# Patient Record
Sex: MALE | Race: WHITE | URBAN - METROPOLITAN AREA
[De-identification: names, ages, dates, MRNs, and addresses within clinical notes are randomized per-mention and may not be internally consistent; named-entity substitution may affect disease eponyms.]

---

## 2017-02-10 ENCOUNTER — OFFICE VISIT (OUTPATIENT)
Dept: FAMILY MEDICINE CLINIC | Facility: CLINIC | Age: 32
End: 2017-02-10

## 2017-02-10 VITALS
DIASTOLIC BLOOD PRESSURE: 64 MMHG | BODY MASS INDEX: 27.4 KG/M2 | WEIGHT: 185 LBS | TEMPERATURE: 98 F | SYSTOLIC BLOOD PRESSURE: 110 MMHG | HEART RATE: 84 BPM | HEIGHT: 69 IN

## 2017-02-10 DIAGNOSIS — R53.83 FATIGUE, UNSPECIFIED TYPE: ICD-10-CM

## 2017-02-10 DIAGNOSIS — R35.0 URINARY FREQUENCY: ICD-10-CM

## 2017-02-10 DIAGNOSIS — Z00.00 HEALTH CARE MAINTENANCE: Primary | ICD-10-CM

## 2017-02-10 DIAGNOSIS — R06.83 SNORING: ICD-10-CM

## 2017-02-10 PROCEDURE — 99385 PREV VISIT NEW AGE 18-39: CPT | Performed by: FAMILY MEDICINE

## 2017-02-10 PROCEDURE — 99213 OFFICE O/P EST LOW 20 MIN: CPT | Performed by: FAMILY MEDICINE

## 2017-02-10 NOTE — PROGRESS NOTES
[address]  PROGRESS NOTE  Chief Complaint:   Patient presents with: Well Adult: get established      HPI:   This is a 36year old male coming in for to get established.   Overall patient states he has done very well he is a single fathe or at rest.  RESPIRATORY:  Denies shortness of breath, wheezing, cough or sputum. GASTROINTESTINAL:  Denies abdominal pain, nausea, vomiting, constipation, diarrhea, or blood in stool.   MUSCULOSKELETAL:  Denies weakness, muscle aches, back pain, joint heron clubbing, FROM, 2+ dorsalis pedis pulses bilaterally. NEURO:  No deficit, normal gait, strength and tone, sensory intact, normal reflexes. ASSESSMENT AND PLAN:   Thania Barrow was seen today for well adult.     Diagnoses and all orders for this visit:    Health

## 2017-02-10 NOTE — PATIENT INSTRUCTIONS
Schedule fasting labs, need 12 hours of water only  Regular exercise  Weight loss  Needs sleep eval of snoring

## 2017-02-17 ENCOUNTER — OFFICE VISIT (OUTPATIENT)
Dept: FAMILY MEDICINE CLINIC | Facility: CLINIC | Age: 32
End: 2017-02-17

## 2017-02-17 ENCOUNTER — LAB ENCOUNTER (OUTPATIENT)
Dept: LAB | Age: 32
End: 2017-02-17
Attending: FAMILY MEDICINE
Payer: COMMERCIAL

## 2017-02-17 VITALS
TEMPERATURE: 99 F | HEART RATE: 87 BPM | BODY MASS INDEX: 25 KG/M2 | DIASTOLIC BLOOD PRESSURE: 74 MMHG | WEIGHT: 166.19 LBS | SYSTOLIC BLOOD PRESSURE: 124 MMHG

## 2017-02-17 DIAGNOSIS — R06.83 SNORING: ICD-10-CM

## 2017-02-17 DIAGNOSIS — Z91.89 AT RISK FOR IMPAIRMENT OF SLEEP: ICD-10-CM

## 2017-02-17 DIAGNOSIS — R35.0 URINARY FREQUENCY: ICD-10-CM

## 2017-02-17 DIAGNOSIS — R53.83 FATIGUE, UNSPECIFIED TYPE: ICD-10-CM

## 2017-02-17 DIAGNOSIS — R06.83 SNORING: Primary | ICD-10-CM

## 2017-02-17 DIAGNOSIS — Z00.00 HEALTH CARE MAINTENANCE: ICD-10-CM

## 2017-02-17 DIAGNOSIS — D64.9 ANEMIA, UNSPECIFIED TYPE: ICD-10-CM

## 2017-02-17 LAB
ALBUMIN SERPL-MCNC: 4 G/DL (ref 3.5–4.8)
ALP LIVER SERPL-CCNC: 83 U/L (ref 45–117)
ALT SERPL-CCNC: 34 U/L (ref 17–63)
AST SERPL-CCNC: 10 U/L (ref 15–41)
BASOPHILS # BLD AUTO: 0.07 X10(3) UL (ref 0–0.1)
BASOPHILS NFR BLD AUTO: 1.1 %
BILIRUB SERPL-MCNC: 0.3 MG/DL (ref 0.1–2)
BILIRUB UR QL STRIP.AUTO: NEGATIVE
BUN BLD-MCNC: 14 MG/DL (ref 8–20)
CALCIUM BLD-MCNC: 9.3 MG/DL (ref 8.3–10.3)
CHLORIDE: 106 MMOL/L (ref 101–111)
CHOLEST SMN-MCNC: 140 MG/DL (ref ?–200)
CLARITY UR REFRACT.AUTO: CLEAR
CO2: 28 MMOL/L (ref 22–32)
COLOR UR AUTO: YELLOW
CREAT BLD-MCNC: 1.06 MG/DL (ref 0.7–1.3)
EOSINOPHIL # BLD AUTO: 0.28 X10(3) UL (ref 0–0.3)
EOSINOPHIL NFR BLD AUTO: 4.3 %
ERYTHROCYTE [DISTWIDTH] IN BLOOD BY AUTOMATED COUNT: 12.5 % (ref 11.5–16)
GLUCOSE BLD-MCNC: 88 MG/DL (ref 70–99)
GLUCOSE UR STRIP.AUTO-MCNC: NEGATIVE MG/DL
HCT VFR BLD AUTO: 33.5 % (ref 37–53)
HDLC SERPL-MCNC: 35 MG/DL (ref 45–?)
HDLC SERPL: 4 {RATIO} (ref ?–4.97)
HGB BLD-MCNC: 10.6 G/DL (ref 13–17)
IMMATURE GRANULOCYTE COUNT: 0.01 X10(3) UL (ref 0–1)
IMMATURE GRANULOCYTE RATIO %: 0.2 %
KETONES UR STRIP.AUTO-MCNC: NEGATIVE MG/DL
LDLC SERPL CALC-MCNC: 67 MG/DL (ref ?–130)
LEUKOCYTE ESTERASE UR QL STRIP.AUTO: NEGATIVE
LYMPHOCYTES # BLD AUTO: 1.73 X10(3) UL (ref 0.9–4)
LYMPHOCYTES NFR BLD AUTO: 26.5 %
M PROTEIN MFR SERPL ELPH: 7.3 G/DL (ref 6.1–8.3)
MCH RBC QN AUTO: 26.9 PG (ref 27–33.2)
MCHC RBC AUTO-ENTMCNC: 31.6 G/DL (ref 31–37)
MCV RBC AUTO: 85 FL (ref 80–99)
MONOCYTES # BLD AUTO: 0.69 X10(3) UL (ref 0.1–0.6)
MONOCYTES NFR BLD AUTO: 10.6 %
NEUTROPHIL ABS PRELIM: 3.74 X10 (3) UL (ref 1.3–6.7)
NEUTROPHILS # BLD AUTO: 3.74 X10(3) UL (ref 1.3–6.7)
NEUTROPHILS NFR BLD AUTO: 57.3 %
NITRITE UR QL STRIP.AUTO: NEGATIVE
NONHDLC SERPL-MCNC: 105 MG/DL (ref ?–130)
PH UR STRIP.AUTO: 5 [PH] (ref 4.5–8)
PLATELET # BLD AUTO: 345 10(3)UL (ref 150–450)
POTASSIUM SERPL-SCNC: 4.4 MMOL/L (ref 3.6–5.1)
PROT UR STRIP.AUTO-MCNC: NEGATIVE MG/DL
RBC # BLD AUTO: 3.94 X10(6)UL (ref 4.3–5.7)
RBC UR QL AUTO: NEGATIVE
RED CELL DISTRIBUTION WIDTH-SD: 38.2 FL (ref 35.1–46.3)
SODIUM SERPL-SCNC: 140 MMOL/L (ref 136–144)
SP GR UR STRIP.AUTO: 1.02 (ref 1–1.03)
TRIGLYCERIDES: 192 MG/DL (ref ?–150)
TSI SER-ACNC: 0.73 MIU/ML (ref 0.35–5.5)
UROBILINOGEN UR STRIP.AUTO-MCNC: <2 MG/DL
VLDL: 38 MG/DL (ref 5–40)
WBC # BLD AUTO: 6.5 X10(3) UL (ref 4–13)

## 2017-02-17 PROCEDURE — 80061 LIPID PANEL: CPT

## 2017-02-17 PROCEDURE — 84443 ASSAY THYROID STIM HORMONE: CPT

## 2017-02-17 PROCEDURE — 99214 OFFICE O/P EST MOD 30 MIN: CPT | Performed by: NURSE PRACTITIONER

## 2017-02-17 PROCEDURE — 82728 ASSAY OF FERRITIN: CPT

## 2017-02-17 PROCEDURE — 85025 COMPLETE CBC W/AUTO DIFF WBC: CPT

## 2017-02-17 PROCEDURE — 81003 URINALYSIS AUTO W/O SCOPE: CPT

## 2017-02-17 PROCEDURE — 80053 COMPREHEN METABOLIC PANEL: CPT

## 2017-02-17 PROCEDURE — 83550 IRON BINDING TEST: CPT

## 2017-02-17 PROCEDURE — 83540 ASSAY OF IRON: CPT

## 2017-02-17 NOTE — PATIENT INSTRUCTIONS
Warned if with sleep apnea and not using CPAP has 7 fold increased risk and heart attack, stroke, abnormal heart rhythm, and death. Increased risk of driving accidents. Advised to refrain from driving when sleepy. Schedule sleep study.   If unable to

## 2017-02-17 NOTE — PROGRESS NOTES
HPI:    Patient ID: Veronique Freed is a 36year old male. HPI  States tired all the time. Unable to fall asleep when goes to bed. Usually around 2 hours to fall asleep. States that he snores. Has had symptoms for awhile.  Was supposed to do a sleep study y and oriented to person, place, and time. Skin: Skin is warm and dry. Psychiatric: He has a normal mood and affect. His behavior is normal. Judgment and thought content normal.   Nursing note and vitals reviewed.              ASSESSMENT/PLAN:   Snoring

## 2017-02-20 ENCOUNTER — TELEPHONE (OUTPATIENT)
Dept: FAMILY MEDICINE CLINIC | Facility: CLINIC | Age: 32
End: 2017-02-20

## 2017-02-20 DIAGNOSIS — D64.9 ANEMIA, UNSPECIFIED TYPE: Primary | ICD-10-CM

## 2017-02-20 LAB
DEPRECATED HBV CORE AB SER IA-ACNC: 4.4 NG/ML (ref 22–322)
IRON SATURATION: 5 % (ref 13–45)
IRON: 25 UG/DL (ref 45–182)
TOTAL IRON BINDING CAPACITY: 532 UG/DL (ref 298–536)
TRANSFERRIN: 357 MG/DL (ref 200–360)

## 2017-02-20 NOTE — TELEPHONE ENCOUNTER
Let pt know the following below. Patient verbalized his understanding and had no other questions at this time. Added labs and confirmed with lab that the orders were in.

## 2017-02-20 NOTE — TELEPHONE ENCOUNTER
Iron and TIBC was not showing up so reordered and called lab back and it was fixed. No other questions at this time.

## 2017-02-20 NOTE — TELEPHONE ENCOUNTER
----- Message from Marcel Gonsalves MD sent at 2/17/2017  8:51 PM CST -----  Mild anemia, not ure of cause  Please add ferritin, serun iron and TIBC  Remainder of labs good  Await sleep eval

## 2017-02-21 ENCOUNTER — TELEPHONE (OUTPATIENT)
Dept: FAMILY MEDICINE CLINIC | Facility: CLINIC | Age: 32
End: 2017-02-21

## 2017-02-21 NOTE — TELEPHONE ENCOUNTER
----- Message from Sonam Yeh MD sent at 2/20/2017  9:31 PM CST -----  Very low iron stores  Likely cause of anemia  Begin iron supplement, 1 bid  Repeat CBC, iros studies in month to prove improvement

## 2017-02-22 RX ORDER — PNV NO.95/FERROUS FUM/FOLIC AC 28MG-0.8MG
TABLET ORAL
Qty: 60 TABLET | Refills: 1 | Status: SHIPPED | OUTPATIENT
Start: 2017-02-22 | End: 2017-04-04

## 2017-02-22 NOTE — TELEPHONE ENCOUNTER
Patient informed of  Dr Keanu Dangelo documentation below. Iron 325mg sent into [facility] for Patient. Asked Patient if He wanted us to send Rx to [facility] or  If He wanted to buy otc. Preferred Rx sent.   Lorelei Main, 02/22/2017, 2:31 PM    Leatha--Please s

## 2017-02-22 NOTE — TELEPHONE ENCOUNTER
----- Message from Evon Mcgee sent at 2/22/2017 12:00 PM CST -----  jessica    Try 2nd @  [id number], 02/22/2017, 12:01 PM

## 2017-03-03 ENCOUNTER — OFFICE VISIT (OUTPATIENT)
Dept: SLEEP CENTER | Facility: CLINIC | Age: 32
End: 2017-03-03

## 2017-03-03 ENCOUNTER — TELEPHONE (OUTPATIENT)
Dept: FAMILY MEDICINE CLINIC | Facility: CLINIC | Age: 32
End: 2017-03-03

## 2017-03-03 DIAGNOSIS — G47.33 OBSTRUCTIVE SLEEP APNEA SYNDROME: Primary | ICD-10-CM

## 2017-03-03 PROCEDURE — 95810 POLYSOM 6/> YRS 4/> PARAM: CPT | Performed by: FAMILY MEDICINE

## 2017-03-17 ENCOUNTER — OFFICE VISIT (OUTPATIENT)
Dept: FAMILY MEDICINE CLINIC | Facility: CLINIC | Age: 32
End: 2017-03-17

## 2017-03-17 VITALS
RESPIRATION RATE: 20 BRPM | TEMPERATURE: 97 F | BODY MASS INDEX: 27.79 KG/M2 | SYSTOLIC BLOOD PRESSURE: 112 MMHG | WEIGHT: 187.63 LBS | HEIGHT: 69 IN | HEART RATE: 76 BPM | DIASTOLIC BLOOD PRESSURE: 62 MMHG

## 2017-03-17 DIAGNOSIS — J35.3 ENLARGED TONSILS AND ADENOIDS: ICD-10-CM

## 2017-03-17 DIAGNOSIS — G47.33 OBSTRUCTIVE SLEEP APNEA (ADULT) (PEDIATRIC): Primary | ICD-10-CM

## 2017-03-17 PROCEDURE — 94660 CPAP INITIATION&MGMT: CPT | Performed by: NURSE PRACTITIONER

## 2017-03-17 NOTE — PATIENT INSTRUCTIONS
Schedule appointment with ENT. Let us know the results of the consultation. Warned if still with sleep apnea and not using CPAP has 7 fold increased risk and heart attack, stroke, abnormal heart rhythm, and death.   Increased risk of driving acciden If the structures completely block the throat, air can’t flow to the lungs at all. This is called apnea (meaning “no breathing”).  Since the lungs aren’t getting fresh air, the brain tells the body to wake up just enough to tighten the muscles and unblock t

## 2017-03-17 NOTE — PROGRESS NOTES
HPI:    Patient ID: Julianne Seymour is a 36year old male. HPI  Patient present to review sleep study results. Informed that the results were positive for mild sleep apnea. Patient states that he really would like to have his tonsils removed.  Has a hx of r ASSESSMENT/PLAN:   Obstructive sleep apnea (adult) (pediatric)  (primary encounter diagnosis)  Enlarged tonsils and adenoids    No orders of the defined types were placed in this encounter. Patient Instructions     Schedule appointment with ENT.    Let If the structures completely block the throat, air can’t flow to the lungs at all. This is called apnea (meaning “no breathing”).  Since the lungs aren’t getting fresh air, the brain tells the body to wake up just enough to tighten the muscles and unblock t

## 2017-03-23 ENCOUNTER — TELEPHONE (OUTPATIENT)
Dept: FAMILY MEDICINE CLINIC | Facility: CLINIC | Age: 32
End: 2017-03-23

## 2017-04-03 ENCOUNTER — APPOINTMENT (OUTPATIENT)
Dept: LAB | Age: 32
End: 2017-04-03
Attending: FAMILY MEDICINE
Payer: COMMERCIAL

## 2017-04-03 DIAGNOSIS — D64.9 ANEMIA, UNSPECIFIED TYPE: ICD-10-CM

## 2017-04-03 PROCEDURE — 83540 ASSAY OF IRON: CPT | Performed by: FAMILY MEDICINE

## 2017-04-03 PROCEDURE — 83550 IRON BINDING TEST: CPT | Performed by: FAMILY MEDICINE

## 2017-04-04 ENCOUNTER — TELEPHONE (OUTPATIENT)
Dept: FAMILY MEDICINE CLINIC | Facility: CLINIC | Age: 32
End: 2017-04-04

## 2017-04-04 DIAGNOSIS — D64.9 ANEMIA, UNSPECIFIED: Primary | ICD-10-CM

## 2017-04-04 RX ORDER — PNV NO.95/FERROUS FUM/FOLIC AC 28MG-0.8MG
TABLET ORAL
Qty: 60 TABLET | Refills: 6 | Status: SHIPPED | OUTPATIENT
Start: 2017-04-04 | End: 2018-03-27

## 2017-04-04 NOTE — TELEPHONE ENCOUNTER
----- Message from Tu Campuzano MD sent at 4/3/2017  5:37 PM CDT -----  Iron levels improving  Continue daily iron  Recheck CBC, serum iron 2 month

## 2017-04-04 NOTE — TELEPHONE ENCOUNTER
Patient advised. Verbalizes understanding. States will need a refill on his iron tabs. Please sign orders.

## 2017-08-11 ENCOUNTER — TELEPHONE (OUTPATIENT)
Dept: FAMILY MEDICINE CLINIC | Facility: CLINIC | Age: 32
End: 2017-08-11

## 2017-09-07 ENCOUNTER — TELEPHONE (OUTPATIENT)
Dept: FAMILY MEDICINE CLINIC | Facility: CLINIC | Age: 32
End: 2017-09-07

## 2017-09-07 NOTE — TELEPHONE ENCOUNTER
Per last lab results in April, Dr. Saundra Wadsworth wanted patient to have a CBC and Iron testing done again in June. Patient informed and will c/b to schedule a lab appt. Orders in chart.  Tobi Barr, 09/07/17, 9:43 AM

## 2017-09-08 ENCOUNTER — LABORATORY ENCOUNTER (OUTPATIENT)
Dept: LAB | Age: 32
End: 2017-09-08
Attending: FAMILY MEDICINE
Payer: COMMERCIAL

## 2017-09-08 DIAGNOSIS — D64.9 ANEMIA: ICD-10-CM

## 2017-09-08 LAB
BASOPHILS # BLD AUTO: 0.08 X10(3) UL (ref 0–0.1)
BASOPHILS NFR BLD AUTO: 1.1 %
EOSINOPHIL # BLD AUTO: 0.28 X10(3) UL (ref 0–0.3)
EOSINOPHIL NFR BLD AUTO: 3.9 %
ERYTHROCYTE [DISTWIDTH] IN BLOOD BY AUTOMATED COUNT: 12.4 % (ref 11.5–16)
HCT VFR BLD AUTO: 45.9 % (ref 37–53)
HGB BLD-MCNC: 15.4 G/DL (ref 13–17)
IMMATURE GRANULOCYTE COUNT: 0.02 X10(3) UL (ref 0–1)
IMMATURE GRANULOCYTE RATIO %: 0.3 %
IRON: 97 UG/DL (ref 45–182)
LYMPHOCYTES # BLD AUTO: 2.49 X10(3) UL (ref 0.9–4)
LYMPHOCYTES NFR BLD AUTO: 34.9 %
MCH RBC QN AUTO: 30.5 PG (ref 27–33.2)
MCHC RBC AUTO-ENTMCNC: 33.6 G/DL (ref 31–37)
MCV RBC AUTO: 90.9 FL (ref 80–99)
MONOCYTES # BLD AUTO: 0.69 X10(3) UL (ref 0.1–0.6)
MONOCYTES NFR BLD AUTO: 9.7 %
NEUTROPHIL ABS PRELIM: 3.57 X10 (3) UL (ref 1.3–6.7)
NEUTROPHILS # BLD AUTO: 3.57 X10(3) UL (ref 1.3–6.7)
NEUTROPHILS NFR BLD AUTO: 50.1 %
PLATELET # BLD AUTO: 257 10(3)UL (ref 150–450)
RBC # BLD AUTO: 5.05 X10(6)UL (ref 4.3–5.7)
RED CELL DISTRIBUTION WIDTH-SD: 40.7 FL (ref 35.1–46.3)
WBC # BLD AUTO: 7.1 X10(3) UL (ref 4–13)

## 2017-09-08 PROCEDURE — 36415 COLL VENOUS BLD VENIPUNCTURE: CPT | Performed by: FAMILY MEDICINE

## 2017-09-08 PROCEDURE — 85025 COMPLETE CBC W/AUTO DIFF WBC: CPT | Performed by: FAMILY MEDICINE

## 2017-09-08 PROCEDURE — 83540 ASSAY OF IRON: CPT | Performed by: FAMILY MEDICINE

## 2017-09-11 ENCOUNTER — TELEPHONE (OUTPATIENT)
Dept: FAMILY MEDICINE CLINIC | Facility: CLINIC | Age: 32
End: 2017-09-11

## 2017-09-11 NOTE — TELEPHONE ENCOUNTER
Patient informed of results. Expressed understanding. Patient wants to know if you would like him to continue taking the iron supplement?    Elkin Ray, [address] 09/11/17 1:51 PM

## 2017-09-11 NOTE — TELEPHONE ENCOUNTER
----- Message from Tu Campuzano MD sent at 9/10/2017  8:13 PM CDT -----  Anemia has resolved  Iron stores better  Continue current  Diet

## 2017-09-25 ENCOUNTER — OFFICE VISIT (OUTPATIENT)
Dept: FAMILY MEDICINE CLINIC | Facility: CLINIC | Age: 32
End: 2017-09-25

## 2017-09-25 VITALS
RESPIRATION RATE: 16 BRPM | TEMPERATURE: 98 F | BODY MASS INDEX: 28.56 KG/M2 | HEART RATE: 103 BPM | DIASTOLIC BLOOD PRESSURE: 84 MMHG | SYSTOLIC BLOOD PRESSURE: 112 MMHG | OXYGEN SATURATION: 95 % | HEIGHT: 69 IN | WEIGHT: 192.81 LBS

## 2017-09-25 DIAGNOSIS — J01.00 ACUTE MAXILLARY SINUSITIS, RECURRENCE NOT SPECIFIED: Primary | ICD-10-CM

## 2017-09-25 PROCEDURE — 99213 OFFICE O/P EST LOW 20 MIN: CPT | Performed by: NURSE PRACTITIONER

## 2017-09-25 RX ORDER — AZITHROMYCIN 250 MG/1
TABLET, FILM COATED ORAL
Qty: 6 TABLET | Refills: 0 | Status: SHIPPED | OUTPATIENT
Start: 2017-09-25 | End: 2019-02-18 | Stop reason: ALTCHOICE

## 2017-09-25 NOTE — PATIENT INSTRUCTIONS
Directed to take antibiotics until gone. Recommend to eat with the antibiotic. Treat symptoms as needed. Return to clinic if not better in 48-72 hours. Otherwise follow-up as needed.

## 2017-09-25 NOTE — PROGRESS NOTES
HPI:    Patient ID: La Nj is a 29year old male. HPI     Present with sinus congestion. Has lots of PND and ST, fatigue. Drainage has been yellow brown with occasionally clear. Sx started late Saturday night. Worse on Sunday.  Rested, and edema and posterior oropharyngeal erythema present. Eyes: Conjunctivae are normal.   Neck: Normal range of motion. Neck supple. No thyromegaly present. Cardiovascular: Normal rate, regular rhythm and normal heart sounds. Exam reveals no friction rub.

## 2018-02-23 ENCOUNTER — APPOINTMENT (OUTPATIENT)
Dept: LAB | Age: 33
End: 2018-02-23
Attending: FAMILY MEDICINE
Payer: COMMERCIAL

## 2018-02-23 ENCOUNTER — OFFICE VISIT (OUTPATIENT)
Dept: FAMILY MEDICINE CLINIC | Facility: CLINIC | Age: 33
End: 2018-02-23

## 2018-02-23 VITALS
DIASTOLIC BLOOD PRESSURE: 70 MMHG | SYSTOLIC BLOOD PRESSURE: 118 MMHG | HEIGHT: 69 IN | WEIGHT: 200 LBS | HEART RATE: 68 BPM | BODY MASS INDEX: 29.62 KG/M2 | TEMPERATURE: 98 F

## 2018-02-23 DIAGNOSIS — R53.83 FATIGUE, UNSPECIFIED TYPE: Primary | ICD-10-CM

## 2018-02-23 DIAGNOSIS — G47.33 OBSTRUCTIVE SLEEP APNEA (ADULT) (PEDIATRIC): ICD-10-CM

## 2018-02-23 DIAGNOSIS — D64.9 ANEMIA, UNSPECIFIED TYPE: ICD-10-CM

## 2018-02-23 DIAGNOSIS — R68.82 DECREASED LIBIDO: ICD-10-CM

## 2018-02-23 LAB
ALBUMIN SERPL-MCNC: 4.2 G/DL (ref 3.5–4.8)
ALP LIVER SERPL-CCNC: 83 U/L (ref 45–117)
ALT SERPL-CCNC: 46 U/L (ref 17–63)
AST SERPL-CCNC: 18 U/L (ref 15–41)
BASOPHILS # BLD AUTO: 0.07 X10(3) UL (ref 0–0.1)
BASOPHILS NFR BLD AUTO: 0.9 %
BILIRUB SERPL-MCNC: 0.3 MG/DL (ref 0.1–2)
BUN BLD-MCNC: 16 MG/DL (ref 8–20)
CALCIUM BLD-MCNC: 9.8 MG/DL (ref 8.3–10.3)
CHLORIDE: 104 MMOL/L (ref 101–111)
CO2: 31 MMOL/L (ref 22–32)
CREAT BLD-MCNC: 1.02 MG/DL (ref 0.7–1.3)
DEPRECATED HBV CORE AB SER IA-ACNC: 29.9 NG/ML (ref 22–322)
EOSINOPHIL # BLD AUTO: 0.42 X10(3) UL (ref 0–0.3)
EOSINOPHIL NFR BLD AUTO: 5.6 %
ERYTHROCYTE [DISTWIDTH] IN BLOOD BY AUTOMATED COUNT: 12.6 % (ref 11.5–16)
GLUCOSE BLD-MCNC: 106 MG/DL (ref 70–99)
HCT VFR BLD AUTO: 46 % (ref 37–53)
HGB BLD-MCNC: 15.3 G/DL (ref 13–17)
IMMATURE GRANULOCYTE COUNT: 0.03 X10(3) UL (ref 0–1)
IMMATURE GRANULOCYTE RATIO %: 0.4 %
IRON SATURATION: 13 % (ref 13–45)
IRON: 62 UG/DL (ref 45–182)
LYMPHOCYTES # BLD AUTO: 2.21 X10(3) UL (ref 0.9–4)
LYMPHOCYTES NFR BLD AUTO: 29.2 %
M PROTEIN MFR SERPL ELPH: 7.4 G/DL (ref 6.1–8.3)
MCH RBC QN AUTO: 30.1 PG (ref 27–33.2)
MCHC RBC AUTO-ENTMCNC: 33.3 G/DL (ref 31–37)
MCV RBC AUTO: 90.4 FL (ref 80–99)
MONOCYTES # BLD AUTO: 0.82 X10(3) UL (ref 0.1–1)
MONOCYTES NFR BLD AUTO: 10.8 %
NEUTROPHIL ABS PRELIM: 4.01 X10 (3) UL (ref 1.3–6.7)
NEUTROPHILS # BLD AUTO: 4.01 X10(3) UL (ref 1.3–6.7)
NEUTROPHILS NFR BLD AUTO: 53.1 %
PLATELET # BLD AUTO: 291 10(3)UL (ref 150–450)
POTASSIUM SERPL-SCNC: 4.2 MMOL/L (ref 3.6–5.1)
RBC # BLD AUTO: 5.09 X10(6)UL (ref 4.3–5.7)
RED CELL DISTRIBUTION WIDTH-SD: 41.2 FL (ref 35.1–46.3)
SODIUM SERPL-SCNC: 140 MMOL/L (ref 136–144)
TESTOSTERONE: 193.3 NG/DL (ref 241–827)
TOTAL IRON BINDING CAPACITY: 465 UG/DL (ref 298–536)
TRANSFERRIN: 312 MG/DL (ref 200–360)
TSI SER-ACNC: 0.91 MIU/ML (ref 0.35–5.5)
WBC # BLD AUTO: 7.6 X10(3) UL (ref 4–13)

## 2018-02-23 PROCEDURE — 83540 ASSAY OF IRON: CPT | Performed by: FAMILY MEDICINE

## 2018-02-23 PROCEDURE — 84403 ASSAY OF TOTAL TESTOSTERONE: CPT | Performed by: FAMILY MEDICINE

## 2018-02-23 PROCEDURE — 80050 GENERAL HEALTH PANEL: CPT | Performed by: FAMILY MEDICINE

## 2018-02-23 PROCEDURE — 36415 COLL VENOUS BLD VENIPUNCTURE: CPT | Performed by: FAMILY MEDICINE

## 2018-02-23 PROCEDURE — 83550 IRON BINDING TEST: CPT | Performed by: FAMILY MEDICINE

## 2018-02-23 PROCEDURE — 82728 ASSAY OF FERRITIN: CPT | Performed by: FAMILY MEDICINE

## 2018-02-23 PROCEDURE — 99214 OFFICE O/P EST MOD 30 MIN: CPT | Performed by: FAMILY MEDICINE

## 2018-02-23 NOTE — PATIENT INSTRUCTIONS
Await labs  Begin exercise 30-40 minutes non stop activity 5 days / week  Finish sleep study if mouth guard does not help

## 2018-02-24 PROBLEM — G47.33 OBSTRUCTIVE SLEEP APNEA (ADULT) (PEDIATRIC): Status: ACTIVE | Noted: 2018-02-24

## 2018-02-24 PROBLEM — D64.9 ANEMIA: Status: ACTIVE | Noted: 2018-02-24

## 2018-02-24 PROBLEM — R68.82 DECREASED LIBIDO: Status: ACTIVE | Noted: 2018-02-24

## 2018-02-26 ENCOUNTER — TELEPHONE (OUTPATIENT)
Dept: FAMILY MEDICINE CLINIC | Facility: CLINIC | Age: 33
End: 2018-02-26

## 2018-02-26 DIAGNOSIS — R79.89 LOW TESTOSTERONE: Primary | ICD-10-CM

## 2018-02-26 NOTE — TELEPHONE ENCOUNTER
----- Message from Clem Harp MD sent at 2/23/2018  9:15 PM CST -----  Iron stores low normal but continue to improve  No anemia, continue daily iron  testosterone  Sl low, recommend seeing urology to discuss treatment, Dr Lashaun Brito if in insurance  Thyroid n

## 2018-03-27 RX ORDER — PNV NO.95/FERROUS FUM/FOLIC AC 28MG-0.8MG
TABLET ORAL
Qty: 60 TABLET | Refills: 6 | Status: CANCELLED
Start: 2018-03-27

## 2018-03-28 RX ORDER — MELATONIN
Qty: 60 TABLET | Refills: 1 | Status: SHIPPED | OUTPATIENT
Start: 2018-03-28 | End: 2019-02-18

## 2018-03-28 RX ORDER — FERROUS SULFATE 325(65) MG
TABLET ORAL
Qty: 180 TABLET | Refills: 3 | Status: SHIPPED | OUTPATIENT
Start: 2018-03-28

## 2018-03-28 NOTE — TELEPHONE ENCOUNTER
Future appt:  None  Last Appointment:  2/23/2018; No f/u recommended    Cholesterol, Total (mg/dL)   Date Value   02/17/2017 140   ----------  HDL Cholesterol (mg/dL)   Date Value   02/17/2017 35 (L)   ----------  LDL Cholesterol (mg/dL)   Date Value   02/

## 2018-03-28 NOTE — TELEPHONE ENCOUNTER
From: Zeke Escalante II  Sent: 3/27/2018 8:55 PM CDT  Subject: Medication Renewal Request    Gisella Simon.  Kendall REILLY would like a refill of the following medications:     Ferrous Sulfate (IRON) 325 (65 Fe) MG Oral Tab Yossi MON MD]    [facility]

## 2018-03-28 NOTE — TELEPHONE ENCOUNTER
Duplicate order. See other request from yesterday, 3/28/2018, that has been pended for Dr. Danish Britton.

## 2018-10-11 ENCOUNTER — TELEPHONE (OUTPATIENT)
Dept: FAMILY MEDICINE CLINIC | Facility: CLINIC | Age: 33
End: 2018-10-11

## 2018-10-11 DIAGNOSIS — G47.33 OSA (OBSTRUCTIVE SLEEP APNEA): Primary | ICD-10-CM

## 2018-10-11 NOTE — TELEPHONE ENCOUNTER
Spoke with patient. States he was not able to go through with having his tonsils removed due to insurance requesting him to at least try the CPAP machine first that was recommended after his Titration study.   Patient states he was never set up on a CPAP m

## 2018-10-11 NOTE — TELEPHONE ENCOUNTER
After speaking with patient, realized he only had a PSG done, that he never returned for a Titration study. Patient in agreement to have the Titration study at this time so that he can be started on a CPAP machine.   Patient would like to have the study do

## 2018-10-11 NOTE — TELEPHONE ENCOUNTER
Patient informed of the below recommendations. Order faxed to [facility] patient scheduling and phone number given to patient to schedule an appt.

## 2018-10-11 NOTE — TELEPHONE ENCOUNTER
Contacted [facility] and recommends that patient have a split-night secondary to the prior authorization that would be required with his insurance. Will do a split-night with an AHI of 5. Order entered. Please fax information to [facility] and let patient know.

## 2018-12-14 ENCOUNTER — TELEPHONE (OUTPATIENT)
Dept: FAMILY MEDICINE CLINIC | Facility: CLINIC | Age: 33
End: 2018-12-14

## 2018-12-18 NOTE — TELEPHONE ENCOUNTER
Patient notified of results and recommendations and expressed understanding.   Need to send script and sleep study report to [facility]  Will do this once we get sleep study report from [facility]  Patient advised will be Thursday or Friday  Patient express

## 2018-12-18 NOTE — TELEPHONE ENCOUNTER
Pt sleep study with largely Upper airway resistance syndrome likely due to his tonsils. Recommend autotitrating cpap  And follow up in  2 weeks to assess  function.

## 2018-12-20 ENCOUNTER — TELEPHONE (OUTPATIENT)
Dept: FAMILY MEDICINE CLINIC | Facility: CLINIC | Age: 33
End: 2018-12-20

## 2018-12-20 ENCOUNTER — SLEEP STUDY (OUTPATIENT)
Dept: FAMILY MEDICINE CLINIC | Facility: CLINIC | Age: 33
End: 2018-12-20
Payer: COMMERCIAL

## 2018-12-20 DIAGNOSIS — G47.33 OSA (OBSTRUCTIVE SLEEP APNEA): Primary | ICD-10-CM

## 2018-12-20 DIAGNOSIS — G47.33 OBSTRUCTIVE SLEEP APNEA: Primary | ICD-10-CM

## 2018-12-20 PROCEDURE — 95810 POLYSOM 6/> YRS 4/> PARAM: CPT | Performed by: FAMILY MEDICINE

## 2018-12-20 NOTE — TELEPHONE ENCOUNTER
Diagnostic sleep study results received and entered into the flowsheet. Recommendation is for patient to start on an auto CPAP with a pressure of 5-16. Patient informed of the results and recommendations.   Patient will contact [facility] to get set

## 2018-12-26 ENCOUNTER — TELEPHONE (OUTPATIENT)
Dept: FAMILY MEDICINE CLINIC | Facility: CLINIC | Age: 33
End: 2018-12-26

## 2018-12-27 NOTE — TELEPHONE ENCOUNTER
Spoke with Moon with [employer].   Was informed that the CPAP that was requested for patient has been denied because his AHI was not >15 alone or >5 with one or more of these conditions:  Impaired cognition  Mood disorders  Treatment resistance hyper

## 2018-12-29 NOTE — TELEPHONE ENCOUNTER
Patient informed of the below. States he already picked up his CPAP machine from [facility]. Patient states he will contact [facility] to see if they got authorization prior to giving the machine to him. States he will contact his insurance if he needs to.   P

## 2019-01-24 ENCOUNTER — TELEPHONE (OUTPATIENT)
Dept: FAMILY MEDICINE CLINIC | Facility: CLINIC | Age: 34
End: 2019-01-24

## 2019-02-01 NOTE — TELEPHONE ENCOUNTER
Your Appointments    Thursday February 07, 2019  1:00 PM CST  Sleep Follow Up with [facility] [id number], [address] [place], [address] ([facility]) [address] [place] [address], [address] [phone]   Friday Chaz Christie

## 2019-02-11 ENCOUNTER — TELEPHONE (OUTPATIENT)
Dept: FAMILY MEDICINE CLINIC | Facility: CLINIC | Age: 34
End: 2019-02-11

## 2019-02-11 NOTE — TELEPHONE ENCOUNTER
Patient was scheduled for an appt on 2/7/2018 to f/u on his CPAP machine. Patient did not come to this appt. LM for patient to r/s.

## 2019-02-12 NOTE — TELEPHONE ENCOUNTER
Patient states \"This is not a very good time. I know I missed your call. I will c/b to schedule an appt with Leatha this afternoon. \"

## 2019-02-18 ENCOUNTER — OFFICE VISIT (OUTPATIENT)
Dept: FAMILY MEDICINE CLINIC | Facility: CLINIC | Age: 34
End: 2019-02-18
Payer: COMMERCIAL

## 2019-02-18 VITALS
WEIGHT: 191.19 LBS | DIASTOLIC BLOOD PRESSURE: 72 MMHG | HEART RATE: 102 BPM | OXYGEN SATURATION: 97 % | HEIGHT: 69 IN | SYSTOLIC BLOOD PRESSURE: 118 MMHG | TEMPERATURE: 99 F | RESPIRATION RATE: 18 BRPM | BODY MASS INDEX: 28.32 KG/M2

## 2019-02-18 DIAGNOSIS — G47.33 OBSTRUCTIVE SLEEP APNEA (ADULT) (PEDIATRIC): Primary | ICD-10-CM

## 2019-02-18 PROCEDURE — 99214 OFFICE O/P EST MOD 30 MIN: CPT | Performed by: NURSE PRACTITIONER

## 2019-02-18 RX ORDER — ZOLPIDEM TARTRATE 5 MG/1
5 TABLET ORAL NIGHTLY PRN
Qty: 30 TABLET | Refills: 0 | Status: SHIPPED
Start: 2019-02-18

## 2019-02-18 NOTE — PROGRESS NOTES
[facility] [place]  SLEEP PROGRESS NOTE        HPI:   This is a 34year old male coming in for Patient presents with:  Obstructive Sleep Apnea (RUBENS): sleep follow up      HPI:      Keny Maria is present to review the initiation of sleep therapy.   He Medical History:   Diagnosis Date   • Anemia     iron defiency   • Sleep apnea      Past Surgical History:   Procedure Laterality Date   • ADENOIDECTOMY     • NM MECKEL'S DIVERTICULUM  (CPT=78290)       Social History:  Social History    Social History René Encounters:  02/18/19 : 191 lb 3.2 oz  02/23/18 : 200 lb  09/25/17 : 192 lb 12.8 oz  03/17/17 : 187 lb 9.6 oz  02/17/17 : 166 lb 3.2 oz  02/10/17 : 185 lb    BP Readings from Last 3 Encounters:  02/18/19 : 118/72  02/23/18 : 118/70  09/25/17 : 112/84 Exercise 30 minutes most days of the week to target heart rate .      Advised patient to change filters,masks,hoses  and tubes and equiptment on a  regular schedule.   Filters and seals shall be changed every 1 month,  Hoses every 3 months,   CPAP mask and

## 2019-02-18 NOTE — PATIENT INSTRUCTIONS
Continue sleep therapy. Take Ambien nightly as needed. Follow-up in 1 month - sooner if needed. If not improving, consider referral to ENT.      Advised if still with sleep apnea and not using CPAP has a  7 fold increase in risk of heart attack, constanza
